# Patient Record
Sex: FEMALE | Race: WHITE | HISPANIC OR LATINO | URBAN - METROPOLITAN AREA
[De-identification: names, ages, dates, MRNs, and addresses within clinical notes are randomized per-mention and may not be internally consistent; named-entity substitution may affect disease eponyms.]

---

## 2021-11-05 ENCOUNTER — OFFICE VISIT (OUTPATIENT)
Dept: PLASTIC SURGERY | Facility: CLINIC | Age: 41
End: 2021-11-05

## 2021-11-05 VITALS — TEMPERATURE: 97.7 F

## 2021-11-05 DIAGNOSIS — L90.8 SKIN AGING: Primary | ICD-10-CM

## 2021-11-05 PROBLEM — R23.8 FACIAL AGING: Status: ACTIVE | Noted: 2021-11-05

## 2021-11-05 PROCEDURE — COS63: Performed by: NURSE PRACTITIONER

## 2021-11-05 PROCEDURE — COS06: Performed by: NURSE PRACTITIONER

## 2021-11-05 RX ORDER — TRIAMCINOLONE ACETONIDE 1 MG/G
CREAM TOPICAL
COMMUNITY
Start: 2021-08-05 | End: 2022-04-12

## 2021-11-05 RX ORDER — PREDNISONE 20 MG/1
TABLET ORAL
COMMUNITY
Start: 2021-08-05 | End: 2022-04-12

## 2021-11-05 RX ORDER — BACLOFEN 10 MG/1
TABLET ORAL
COMMUNITY
Start: 2021-09-10

## 2021-11-05 RX ORDER — CEPHALEXIN 500 MG/1
CAPSULE ORAL
COMMUNITY
Start: 2021-08-05 | End: 2022-04-12

## 2021-11-05 RX ORDER — DICLOFENAC SODIUM 75 MG/1
TABLET, DELAYED RELEASE ORAL
COMMUNITY
Start: 2021-09-10

## 2021-11-05 NOTE — PROGRESS NOTES
Subjective   Patient ID: Guadalupe is a 41 y.o. female.    History of Present Illness: History of Present Illness    Objective   Physical Exam    Assessment/Plan   {Assess/Plan SmartLinks (Optional):96258}

## 2021-11-05 NOTE — PROGRESS NOTES
Chief Complaint  Follow-up and Botulinum Toxin Injection    Subjective          History of Present Illness  Guadalupe Prakash is a 41 y.o. female who presents to [facility] as  consult for Botox Injection.  Concerns with the sides of the eyes mostly  Wants a little Botox but not a lot because this is the first time..Current skin care includes cleanser in the morning and night, Hyrolonic acid cream at night, Eye cream day and night  and Vitamin C PRN.      Allergies: Patient has no known allergies.  Allergies Reconciled.    Review of Systems     Objective     Temp 97.7 °F (36.5 °C) (Temporal)     There is no height or weight on file to calculate BMI.    Physical Exam  Head and Face  Dynamic greater than static rhytids of periorbital areas, no open areas or irritation        Result Review :     Procedures  • Photos were obtained.  •  services were used to discuss the indications, benefits, risks, alternatives, expected outcomes, and complications as to the application of Botulinum toxin to the face was discussed with the patient.  Informed consent was obtained.  She understands, accept risks, consents and wish to proceed.   • Description: Botox Injection.  In the exam room, patient's face was prepped with alcohol.  Using Botox in a 1:1 ration, periorbital areas were injected.  The patient tolerated the procedure well with no immediate complications.  Post procedure instructions were given., After injection, the areas were gently massaged.  The patient was given an icepack.  • Lot#: [id number]  • Exp#: 11/2023  • NDC: Botox: 9931-4304-40  • Total Units Used:22  • Forehead:  units  • Glabella:  units  • Right Periorbital:11  units  • Left Periorbital:11 units  • Right Perioral: units  • Left Perioral: units       Assessment and Plan      Diagnoses and all orders for this visit:    1. Skin aging (Primary)    Other orders  -     Cancel: Botulinum  Injection        Plan:  •  The patient tolerated the procedure well with no immediate complications. The patient was given an ice pack and post procedure instructions. The patient will follow-up in 2 weeks. May call office with any concerns or questions.        Follow Up     No follow-ups on file.    Patient was given instructions and counseling regarding her condition. Please see specific information pulled into the AVS if appropriate.     Isadora Bernstein, APRN  11/05/2021

## 2021-11-11 NOTE — PROGRESS NOTES
Chief Complaint  Botulinum Toxin Injection (11/05/2021 22 units )    Subjective          History of Present Illness  Guadalupe Prakash is a 41 y.o. female who presents to [facility] for Botox injection. Patient happy with results wants to do Botox in the forehead area. Declined intrepreter for today and states can understand english well enough but will let us know if she does not understand and wants .       Allergies: Patient has no known allergies.  Allergies Reconciled.    Review of Systems     Objective     Temp 97.8 °F (36.6 °C) (Temporal)     There is no height or weight on file to calculate BMI.    Physical Exam  Head and Face  Dynamic greater than static rhytids of forehead, glabella; improved rhytids of periorbital area, no open areas or irritation        Result Review :     Procedures  • Photos were obtained.  • The indications, benefits, risks, alternatives, expected outcomes, and complications as to the application of Botulinum toxin to the face was discussed with the patient.  Informed consent was obtained.  They understand, accept risks, consent and wish to proceed.   • Description: Botox Injection.  In the exam room, patient's face was prepped with alcohol.  Using Botox in a 1:1 ration, glabella, and forehead were injected.  The patient tolerated the procedure well with no immediate complications.  Post procedure instructions were given., After injection, the areas were gently massaged.   and The patient was given an icepack.  • Lot#: [id number]  • Exp#: 11/2023  • NDC: Botox: 9781-6427-01  • Total Units Used: 20  • Forehead: 9 units  • Glabella: 11 units       Assessment and Plan      Diagnoses and all orders for this visit:    1. Facial aging (Primary)        Plan:  • The patient tolerated the procedure well with no immediate complications. The patient was given an ice pack and post procedure instructions. The patient will follow-up in 2 weeks.  May call office with any concerns or questions.        Follow Up     No follow-ups on file.    Patient was given instructions and counseling regarding her condition. Please see specific information pulled into the AVS if appropriate.     Isadora Bernstein, APRN  11/19/2021

## 2021-11-19 ENCOUNTER — OFFICE VISIT (OUTPATIENT)
Dept: PLASTIC SURGERY | Facility: CLINIC | Age: 41
End: 2021-11-19

## 2021-11-19 VITALS — TEMPERATURE: 97.8 F

## 2021-11-19 DIAGNOSIS — R23.8 FACIAL AGING: Primary | ICD-10-CM

## 2021-11-19 PROCEDURE — COS06: Performed by: NURSE PRACTITIONER

## 2021-11-19 PROCEDURE — COS64: Performed by: NURSE PRACTITIONER

## 2021-12-02 NOTE — PROGRESS NOTES
Chief Complaint  Botulinum Toxin Injection (22 units on 11/5/21)    Subjective          History of Present Illness  Guadalupe Prakash is a 41 y.o. female who presents to [facility] for Botox injection follow up. Patient happy with results. Declined intrepreter for today and states can understand english well enough but will let us know if she does not understand and wants .       Allergies: Patient has no known allergies.  Allergies Reconciled.    Review of Systems     Objective     There were no vitals taken for this visit.    There is no height or weight on file to calculate BMI.    Physical Exam  Head and Face  Improved Dynamic greater than static rhytids of forehead, glabella; improved rhytids of periorbital area, no open areas or irritation        Result Review :     Procedures  • Photos were obtained.         Assessment and Plan      Diagnoses and all orders for this visit:    1. Facial aging (Primary)        Plan:  • Discussed may need repeat injection 3-6 months to maintain results. May follow up PRN. Sample of GSR was given.        Follow Up     No follow-ups on file.    Patient was given instructions and counseling regarding her condition. Please see specific information pulled into the AVS if appropriate.     Isadora Bernstein, APRN  12/03/2021

## 2021-12-03 ENCOUNTER — OFFICE VISIT (OUTPATIENT)
Dept: PLASTIC SURGERY | Facility: CLINIC | Age: 41
End: 2021-12-03

## 2021-12-03 DIAGNOSIS — R23.8 FACIAL AGING: Primary | ICD-10-CM

## 2021-12-03 PROCEDURE — COS64: Performed by: NURSE PRACTITIONER

## 2022-04-07 NOTE — PROGRESS NOTES
Chief Complaint  Botulinum Toxin Injection    Subjective          History of Present Illness  Guadalupe Prakash is a 42 y.o. female who presents to [facility] for Botox Injection. Is just wanting her crows feet.       Allergies: Patient has no known allergies.  Allergies Reconciled.    Review of Systems   All review of system has been reviewed and it  is negative except the ones note above.     Objective     There were no vitals taken for this visit.    There is no height or weight on file to calculate BMI.    Physical Exam  Head and Face  Dynamic greater than static rhytids of periorbital areas, no open areas or irritation        Result Review :     Botulinum Injection    Date/Time: 4/12/2022 1:38 PM  Performed by: Isadora Bernstein APRN  Authorized by: Isadora Bernstein APRN       Consent:      Consent obtained:  Written     Consent given by:  Patient    Procedure details:      The procedure was cosmetic.    Medication(s) used: Botox       Total units available:  22     Total units wasted:  22      • Photos were obtained.  • The indications, benefits, risks, alternatives, expected outcomes, and complications as to the application of Botulinum toxin to the face was discussed with the patient.  Informed consent was obtained.  They understand, accept risks, consent and wish to proceed.   • Description: Botox Injection.  In the exam room, patient's face was prepped with alcohol.  Using Botox in a 1:1 ration, periorbital areas were injected.  The patient tolerated the procedure well with no immediate complications.  Post procedure instructions were given., After injection, the areas were gently massaged. The patient was given an icepack.  • Lot#: [id number]  • Exp#: 10/2024  • NDC: Botox: 2533-3156-19  • Total Units Used: 22  • Right Periorbital: 11 units  • Left Periorbital: 11 units       Assessment and Plan      Diagnoses and all orders for this visit:    1. Facial aging  (Primary)    Other orders  -     Botulinum Injection        Plan:  • The patient tolerated the procedure well with no immediate complications. The patient was given an ice pack and post procedure instructions. The patient will follow-up in 2 weeks. May call office with any concerns or questions.        Follow Up     No follow-ups on file.    Patient was given instructions and counseling regarding her condition. Please see specific information pulled into the AVS if appropriate.     EMMA Moore  04/12/2022

## 2022-04-12 ENCOUNTER — PROCEDURE VISIT (OUTPATIENT)
Dept: PLASTIC SURGERY | Facility: CLINIC | Age: 42
End: 2022-04-12

## 2022-04-12 DIAGNOSIS — R23.8 FACIAL AGING: Primary | ICD-10-CM

## 2022-04-12 PROCEDURE — COS64: Performed by: NURSE PRACTITIONER

## 2022-04-12 PROCEDURE — COS06: Performed by: NURSE PRACTITIONER

## 2022-07-18 ENCOUNTER — PROCEDURE VISIT (OUTPATIENT)
Dept: PLASTIC SURGERY | Facility: CLINIC | Age: 42
End: 2022-07-18

## 2022-07-18 DIAGNOSIS — R23.8 FACIAL AGING: ICD-10-CM

## 2022-07-18 PROCEDURE — COS64: Performed by: NURSE PRACTITIONER

## 2022-07-18 PROCEDURE — COS06: Performed by: NURSE PRACTITIONER

## 2022-07-18 NOTE — PROGRESS NOTES
Chief Complaint  Botulinum Toxin Injection    Subjective          History of Present Illness  Guadalupe Prakash is a 42 y.o. female who presents to [facility] for Botox Injection. She is wanting eyes treated and to add her forehead this time too.        Allergies: Patient has no known allergies.  Allergies Reconciled.    Review of Systems   All review of system has been reviewed and it  is negative except the ones note above.     Objective     There were no vitals taken for this visit.    There is no height or weight on file to calculate BMI.    Physical Exam  Head and Face  Dynamic greater than static rhytids of forehead and periorbital areas, no open areas or irritation        Result Review :     Botulinum Injection    Date/Time: 7/18/2022 3:05 PM  Performed by: Isadora Bernstein APRN  Authorized by: Isadora Bernstein APRN       Consent:      Consent obtained:  Written     Consent given by:  Patient    Procedure details:      The procedure was cosmetic.    Medication(s) used: Botox       Total units wasted:  0      • Photos were obtained.  • The indications, benefits, risks, alternatives, expected outcomes, and complications as to the application of Botulinum toxin to the face was discussed with the patient.  Informed consent was obtained.  They understand, accept risks, consent and wish to proceed.   • Description: Botox Injection.  In the exam room, patient's face was prepped with alcohol.  Using Botox in a 1:1 ration, periorbital and forehead were injected.  The patient tolerated the procedure well with no immediate complications.  Post procedure instructions were given. After injection, the areas were gently massaged.  The patient was given an icepack.  • Lot#: [id number]  • Exp#: 03/2025  • NDC: Botox: 5283-7531-66  • Total Units Used: 18  • Forehead:  6 units    • Right Periorbital: 6 units  • Left Periorbital: 6 units       Assessment and Plan      Diagnoses and  all orders for this visit:    1. Facial aging    Other orders  -     Botulinum Injection        Plan:  • The patient wanted to decrease dose around eyes. She tolerated the procedure well with no immediate complications. The patient was given an ice pack and post procedure instructions. The patient will follow-up in 2 weeks. May call office with any concerns or questions.        Follow Up     No follow-ups on file.    Patient was given instructions and counseling regarding her condition. Please see specific information pulled into the AVS if appropriate.     Isadora Bernstein, EMMA  07/18/2022

## 2022-11-08 ENCOUNTER — PROCEDURE VISIT (OUTPATIENT)
Dept: PLASTIC SURGERY | Facility: CLINIC | Age: 42
End: 2022-11-08

## 2022-11-08 DIAGNOSIS — R23.8 FACIAL AGING: Primary | ICD-10-CM

## 2022-11-08 PROCEDURE — COS06: Performed by: NURSE PRACTITIONER

## 2022-11-08 NOTE — PROGRESS NOTES
Chief Complaint  Botulinum Toxin Injection (Botox Injections )    Subjective          History of Present Illness  Guadalupe Prakash is a 42 y.o. female who presents to [facility] as a follow up for Botox Injection.. Her last treatment was 07/18/22. She received 18 units total to periorbital and forehead. She is just wanted periorbital treated today.       Allergies: Patient has no known allergies.  Allergies Reconciled.    Review of Systems   All review of system has been reviewed and it  is negative except the ones note above.     Objective     There were no vitals taken for this visit.    There is no height or weight on file to calculate BMI.    Physical Exam  Head and Face  Dynamic greater than static rhytids of lower periorbital areas, no open areas or irritation        Result Review :     Botulinum Injection    Date/Time: 11/8/2022 2:11 PM  Performed by: Isadora Bernstein APRN  Authorized by: Isadora Bernstein APRN       Consent:      Consent obtained:  Written     Consent given by:  Patient     Risks discussed:  Dysphagia, weakness, poor cosmetic result, pain, infection, bleeding and bruising    Procedure details:      The procedure was cosmetic.    Medication(s) used: Botox       Total units available:  12     Total units wasted:  12      • Photos were obtained.  • The indications, benefits, risks, alternatives, expected outcomes, and complications as to the application of Botulinum toxin/Dysport to the face was discussed with the patient.  Informed consent was obtained.  They understand, accept risks, consent and wish to proceed.   • Description:  In the exam room, patient's face was prepped with alcohol.  Using Botox in a 1:1 ration (Dysport in a 3:1 ration), periorbital, glabella, and forehead were injected.  The patient tolerated the procedure well with no immediate complications.  Post procedure instructions were given.  After injection, the areas were gently  massaged. The patient was given an icepack.  • Lot#: [id number]  • Exp#: 03/2025  • NDC: Botox: 1930-7876-86  • Total Units Used: 12  • Right Periorbital: 6 units  • Left Periorbital: 6 units       Assessment and Plan      Diagnoses and all orders for this visit:    1. Facial aging (Primary)        Plan:  The patient tolerated the procedure well with no immediate complications. The patient may follow-up in 2 weeks if needed for touch up. May call office with any concerns or questions. Discussed treating puffiness under eyes with hydrafirm but patient will try allergy medications, massage and cold packs first.     Scribed by Alexandrea Mckeon MA, acting as a scribe for EMMA Moore, 11/08/22 14:11 EST.  EMMA Moore's signature on the note affirms that the note adequately documents the care provided.    Patient was given instructions and counseling regarding her condition. Please see specific information pulled into the AVS if appropriate.     EMMA Moore  11/08/2022

## 2023-03-31 NOTE — PROGRESS NOTES
Chief Complaint  No chief complaint on file.    Subjective          History of Present Illness  Guadalupe Prakash is a 43 y.o. female who presents to [facility]  for Botox Injection. Her last treatment was 11/8/22. She received 12 units total to periorbital.     Today patient is interested in continuing the periorbital treatment but would also like to address her forehead    Allergies: Patient has no known allergies.  Allergies Reconciled.    Review of Systems   All review of system has been reviewed and it  is negative except the ones note above.     Objective     /71 (BP Location: Left arm, Patient Position: Sitting)   Pulse 73   Temp 96.1 °F (35.6 °C) (Temporal)   Wt 61.7 kg (136 lb)   SpO2 97%     There is no height or weight on file to calculate BMI.    Physical Exam  Head and Face  Dynamic greater than static rhytids of lower periorbital areas but good carry over, rhytids of forehead no open areas or irritation        Result Review :     Procedures  • Photos were obtained.  • The indications, benefits, risks, alternatives, expected outcomes, and complications as to the application of Botulinum toxin/Dysport to the face was discussed with the patient.  Informed consent was obtained.  They understand, accept risks, consent and wish to proceed.   • Description:  In the exam room, patient's face was prepped with alcohol.  Using Botox in a 1:1 ration (Dysport in a 3:1 ration), periorbital, glabella, and forehead were injected.  The patient tolerated the procedure well with no immediate complications.  Post procedure instructions were given.  After injection, the areas were gently massaged. The patient was given an icepack.  • Lot#: [id number]  • Exp#: 11/2025  • NDC: Botox: 1542-3831-18  • Total Units Used: 13  • Right Periorbital: 4 units  • Left Periorbital: 4 units  • Forehead:  5 units       Assessment and Plan      Diagnoses and all orders for this  visit:    1. Postoperative follow-up (Primary)        Plan:  The patient tolerated the procedure well with no immediate complications. The patient may follow-up in 2 weeks if needed for touch up. May call office with any concerns or questions.  Scribed by Alexandrea Mckeon MA, acting as a scribe for EMMA Moore, 11/08/22 14:11 EST.  EMMA Moore's signature on the note affirms that the note adequately documents the care provided.    Patient was given instructions and counseling regarding her condition. Please see specific information pulled into the AVS if appropriate.     EMMA Moore  04/03/2023

## 2023-04-03 ENCOUNTER — PROCEDURE VISIT (OUTPATIENT)
Dept: PLASTIC SURGERY | Facility: CLINIC | Age: 43
End: 2023-04-03

## 2023-04-03 VITALS
WEIGHT: 136 LBS | TEMPERATURE: 96.1 F | DIASTOLIC BLOOD PRESSURE: 71 MMHG | SYSTOLIC BLOOD PRESSURE: 106 MMHG | OXYGEN SATURATION: 97 % | HEART RATE: 73 BPM

## 2023-04-03 DIAGNOSIS — Z09 POSTOPERATIVE FOLLOW-UP: Primary | ICD-10-CM

## 2023-04-03 PROCEDURE — KYSALESTAX: Performed by: NURSE PRACTITIONER

## 2023-04-03 PROCEDURE — COS64: Performed by: NURSE PRACTITIONER

## 2023-04-03 PROCEDURE — COS06: Performed by: NURSE PRACTITIONER

## 2023-09-29 NOTE — PROGRESS NOTES
Chief Complaint  Botulinum Toxin Injection (Injections today)    Subjective          History of Present Illness  Guadalupe Prakash is a 43 y.o. female who presents to [facility]  for Botox Injection. Her last treatment was 11/8/22. She received 12 units total to periorbital.     [employer] Interpretor was used for visit today at patients request.  Patient complains of headaches in her temples and back of head.  Concerned if this could be caused by botox treatments.      Allergies: Patient has no known allergies.  Allergies Reconciled.    Review of Systems   All review of system has been reviewed and it  is negative except the ones note above.     Objective     /81 (BP Location: Left arm, Patient Position: Sitting, Cuff Size: Adult)   Pulse 57   Temp 98.4 øF (36.9 øC) (Temporal)   Wt 61.7 kg (136 lb)   SpO2 95%     There is no height or weight on file to calculate BMI.    Physical Exam   Cardiovascular: Normal rate.     Pulmonary/Chest  Effort normal.     Head and Face  Dynamic greater than static rhytids of lower periorbital areas but good carry over, rhytids of forehead no open areas or irritation        Result Review :     Procedures  Photos were obtained.  The indications, benefits, risks, alternatives, expected outcomes, and complications as to the application of Botulinum toxin to the face was discussed with the patient.  Informed consent was obtained.  They understand, accept risks, consent and wish to proceed.   Description:  In the exam room, patient's face was prepped with alcohol.  Using Botox in a 1:1 ration, periorbital and forehead were injected.  The patient tolerated the procedure well with no immediate complications, minimal bruising with injections noted and arnica applied.    Post procedure instructions were given.  After injection, the areas were gently massaged. The patient was given an icepack.  Lot#: [id number]  Exp#: 4/2025  NDC: Botox:  9609-7132-07  Total Units Used: 13  Right Periorbital: 4 units  Left Periorbital: 4 units  Forehead:  5 units       Assessment and Plan      Diagnoses and all orders for this visit:    1. Facial aging (Primary)      Full face was assessed, discussed that glabella would benefit from Botox and would get longer effect with  recommended doses. She wanted to cont with last injection treatment.      Plan:  discussed that botox is used to treat migraines and could benefit from evaluation for Botox for migraines. Current medications Topamax and Diclofenac were discussed with patient, thru interpretor.  The risk of bruising was explained due to diclofenac use.  Patient verbalized understanding.  Patient will call for next appointment.  patient instructed to call office for any questions or concerns and verbalized understanding of all instructions given.     Patient was given instructions and counseling regarding her condition. Please see specific information pulled into the AVS if appropriate.     Scribed by Isidra Alfaro, acting as a scribe for EMMA Chand, 10/09/23 13:49 EDT.  EMMA Chand's signature on the note affirms that the note adequately documents the care provided.   EMMA Chand  10/09/2023

## 2023-10-09 ENCOUNTER — PROCEDURE VISIT (OUTPATIENT)
Dept: PLASTIC SURGERY | Facility: CLINIC | Age: 43
End: 2023-10-09

## 2023-10-09 VITALS
HEART RATE: 57 BPM | TEMPERATURE: 98.4 F | SYSTOLIC BLOOD PRESSURE: 143 MMHG | OXYGEN SATURATION: 95 % | DIASTOLIC BLOOD PRESSURE: 81 MMHG | WEIGHT: 136 LBS

## 2023-10-09 DIAGNOSIS — R23.8 FACIAL AGING: Primary | ICD-10-CM

## 2023-10-09 PROCEDURE — COS06: Performed by: NURSE PRACTITIONER

## 2023-10-09 PROCEDURE — KYSALESTAX: Performed by: NURSE PRACTITIONER

## 2023-10-09 RX ORDER — TOPIRAMATE 50 MG/1
TABLET, FILM COATED ORAL 2 TIMES DAILY
COMMUNITY

## 2024-03-22 NOTE — PROGRESS NOTES
"Chief Complaint  Botulinum Toxin Injection (Botox injection)    Subjective  wants to continue with same treatment        History of Present Illness  Guadalupe Prakash is a 44 y.o. female who presents to [facility] for botox injection.    Pt presents today for Botox injections. Last injection was 10/9/23 for 13 units, dosage worked well.      Allergies: Patient has no known allergies.  Allergies Reconciled.    Review of Systems   Constitutional: Negative.    HENT: Negative.     Eyes: Negative.    Respiratory: Negative.     Cardiovascular: Negative.    Gastrointestinal: Negative.    Endocrine: Negative.    Genitourinary: Negative.    Musculoskeletal: Negative.    Skin: Negative.    Allergic/Immunologic: Negative.    Neurological: Negative.    Hematological: Negative.    Psychiatric/Behavioral: Negative.        All review of system has been reviewed and it  is negative except the ones note above.     Objective     BP 98/64 (BP Location: Left arm, Patient Position: Sitting, Cuff Size: Adult)   Pulse 97   Temp 97 °F (36.1 °C) (Temporal)   Wt 63.5 kg (140 lb)   SpO2 97%     There is no height or weight on file to calculate BMI.    Physical Exam   Vitals reviewed.  Constitutional  She appears well-developed and well-nourished.     Eyes  System normal.       Cardiovascular: Normal rate.     Pulmonary/Chest  Effort normal.     Skin  Skin is warm and dry.     Psychiatric  She has a normal mood and affect.     Face  Her facial skeleton is symmetrical. She has rhytids that are present with animation, have prominent forehead lines and have prominent nasal lines.. Her skin is a III on the Thornton scale.Her facial expression is fully symmetric. She has facial weakness in the following areas: none on the right, and none on the left.         Face comments: Mid face volume  loss, she is concerned with there being \"puffiness\" under  the left eye at zygomatic arch and not on the " right. Discussed facial volume, sub q tissue and skin laxity, as well as natural asymmetry. Discussed Sculptra and fillers, what to expect and how each one can be beneficial.    Forehead and Brow  She is positive for rhytids.     Head and Face  Dynamic greater than static rhytids of forehead, glabella, and periorbital areas, no open areas or irritation        Result Review :  no new results to review this visit    Procedures  Photos were obtained.  The indications, benefits, risks, alternatives, expected outcomes, and complications as to the application of Botulinum toxin/Dysport to the face was discussed with the patient.  Informed consent was obtained.  They understand, accept risks, consent and wish to proceed.   Description:  In the exam room, patient's face was prepped with alcohol.  Using Botox in a 1:1 ration (Dysport in a 3:1 ration), periorbital, and forehead were injected.  The patient tolerated the procedure well with no immediate complications.  Post procedure instructions were given.  After injection, the areas were gently massaged.  Lot#: [id number]  Exp#: 05/2026  NDC: Botox: 9078-5162-18  Total Units Used: 13 units  Forehead: 5 units  Right Periorbital: 4 units  Left Periorbital: 4 units        Assessment and Plan      Diagnoses and all orders for this visit:    1. Facial aging (Primary)        Plan:  The patient tolerated the procedure well with no immediate complications.    RTC in 3 months for next injection.    Patient instructed to call office for any questions or concerns and verbalized understanding of all instructions given.      Scribed by Alexandrea Mckeon MA, acting as a scribe for EMMA Chand, 03/28/24 11:54 EDT.  EMMA Chand's signature on the note affirms that the note adequately documents the care provided.         Patient was given instructions and counseling regarding her condition. Please see specific information pulled into the AVS if appropriate.     Della  CAT Estrada, APRN  03/28/2024

## 2024-03-28 ENCOUNTER — PROCEDURE VISIT (OUTPATIENT)
Dept: PLASTIC SURGERY | Facility: CLINIC | Age: 44
End: 2024-03-28

## 2024-03-28 VITALS
DIASTOLIC BLOOD PRESSURE: 64 MMHG | WEIGHT: 140 LBS | HEART RATE: 97 BPM | SYSTOLIC BLOOD PRESSURE: 98 MMHG | TEMPERATURE: 97 F | OXYGEN SATURATION: 97 %

## 2024-03-28 DIAGNOSIS — R23.8 FACIAL AGING: Primary | ICD-10-CM

## 2024-05-24 ENCOUNTER — TELEPHONE (OUTPATIENT)
Dept: PLASTIC SURGERY | Facility: CLINIC | Age: 44
End: 2024-05-24

## 2024-05-24 NOTE — TELEPHONE ENCOUNTER
LVM FOR PATIENT TO CALL AND RESCHEDULE UPCOMING APPOINTMENT IN JUNE WITH DR. WHITT.    PLEASE TRANSFER TO THE OFFICE WHEN THE PATIENT RETURNS THE PHONE CALL.

## 2024-08-05 NOTE — PROGRESS NOTES
Chief Complaint  Botulinum Toxin Injection (Botox injection)    Subjective          History of Present Illness  Guadalupe Prakash is a 44 y.o. female who presents to [facility] as a Botox injection.    Pt had 13 units of Botox on 3/28/24.      Pt is interested in Vollure fillers for tear through    Allergies: Patient has no known allergies.  Allergies Reconciled.    Review of Systems   All review of system has been reviewed and it  is negative except the ones note above.     Objective     /67 (BP Location: Left arm, Patient Position: Sitting, Cuff Size: Adult)   Pulse 70   Wt 61.9 kg (136 lb 6.4 oz)   SpO2 98%     There is no height or weight on file to calculate BMI.    Physical Exam  Head and Face    Presence of dynamic Forehead wrinkles     Result Review :     Procedures  Photos were obtained.  The indications, benefits, risks, alternatives, expected outcomes, and complications as to the application of Botulinum toxin/Dysport to the face was discussed with the patient.  Informed consent was obtained.  They understand, accept risks, consent and wish to proceed.   Description:  In the exam room, patient's face was prepped with alcohol.  Using Botox in a 1:1 ration (Dysport in a 3:1 ration), periorbital, glabella, and forehead were injected.  The patient tolerated the procedure well with no immediate complications.  Post procedure instructions were given.  After injection, the areas were gently massaged. The patient was given an icepack.  Lot#: [id number]  Exp#: 07/2026  NDC: Botox: 8555-3791-92   Total Units Used:14 units  Forehead: 6 units  Glabella:  units  Right Periorbital: 4 units  Left Periorbital:4 units       Assessment and Plan      Diagnoses and all orders for this visit:    1. Facial aging [R23.8] (Primary)    This is a cosmetic visit.     The patient tolerated the procedure well with no immediate complications. The patient was given an ice pack and  post procedure instructions. The patient will follow-up in 2 weeks. May call office with any concerns or questions.    Scribed by Yuli Torres, acting as a scribe for Rosario Gillespie MD, 08/15/24 13:58 EDT.  Rosario Gillespie MD's signature on the note affirms that the note adequately documents the care provided.         Follow Up     Return RTC for next Botox injection.    Patient was given instructions and counseling regarding her condition. Please see specific information pulled into the AVS if appropriate.     Rosario Gillespie MD  08/15/2024

## 2024-08-07 ENCOUNTER — TELEPHONE (OUTPATIENT)
Dept: PLASTIC SURGERY | Facility: CLINIC | Age: 44
End: 2024-08-07

## 2024-08-07 NOTE — TELEPHONE ENCOUNTER
Spoke with patient about having an onsite  and she stated it was not necessary for her to have one to see Dr. Gillespie.

## 2024-08-15 ENCOUNTER — PROCEDURE VISIT (OUTPATIENT)
Dept: PLASTIC SURGERY | Facility: CLINIC | Age: 44
End: 2024-08-15

## 2024-08-15 VITALS
SYSTOLIC BLOOD PRESSURE: 102 MMHG | OXYGEN SATURATION: 98 % | WEIGHT: 136.4 LBS | DIASTOLIC BLOOD PRESSURE: 67 MMHG | HEART RATE: 70 BPM

## 2024-08-15 DIAGNOSIS — R23.8 FACIAL AGING: Primary | ICD-10-CM

## 2024-08-15 PROCEDURE — KYSALESTAX: Performed by: SURGERY

## 2024-08-15 PROCEDURE — COS06: Performed by: SURGERY

## 2024-09-18 ENCOUNTER — TELEPHONE (OUTPATIENT)
Dept: PLASTIC SURGERY | Facility: CLINIC | Age: 44
End: 2024-09-18

## 2024-09-23 ENCOUNTER — TELEPHONE (OUTPATIENT)
Dept: PLASTIC SURGERY | Facility: CLINIC | Age: 44
End: 2024-09-23

## 2024-11-25 NOTE — PROGRESS NOTES
Chief Complaint  Botulinum Toxin Injection (Botox)    Subjective          History of Present Illness  Guadalupe Prakash is a 44 y.o. female who presents to [facility] as a Botox injection.    Pt had 14 units of Botox on 8/15/24. Overall she reports satisfaction with her previous treatment, but c/o chandu-orbital rhytids, Left > Right, which she attributes to sleeping on this side.          Allergies: Patient has no known allergies.  Allergies Reconciled.    Review of Systems   All review of system has been reviewed and it  is negative except the ones note above.     Objective     /72 (BP Location: Left arm, Patient Position: Sitting, Cuff Size: Adult)   Pulse 84   Wt 63.5 kg (140 lb)   SpO2 97%     There is no height or weight on file to calculate BMI.    Physical Exam        Head and Face    Presence of dynamic Forehead and chandu-orbital rhytids.      Result Review :     Procedures  Photos were obtained.  The indications, benefits, risks, alternatives, expected outcomes, and complications as to the application of Botulinum toxin/Dysport to the face was discussed with the patient.  Informed consent was obtained.  They understand, accept risks, consent and wish to proceed.   Description:  In the exam room, patient's face was prepped with alcohol.  Using Botox in a 1:1 ration (Dysport in a 3:1 ration), periorbital, glabella, and forehead were injected.  The patient tolerated the procedure well with no immediate complications.  Post procedure instructions were given.  After injection, the areas were gently massaged. The patient was given an icepack.  Lot#: [id number]  Exp#: 10/2026  NDC: Botox: 8286-8358-32   Total Units Used:16 units  Forehead: 6  units  Glabella: 0 units  Right Periorbital:4 units  Left Periorbital:6 units       Assessment and Plan      Diagnoses and all orders for this visit:    1. Facial aging [R23.8] (Primary)    2. Skin aging    Other orders  -      tretinoin (RETIN-A) 0.025 % cream; Apply 1 Application topically to the appropriate area as directed Every Night. Apply thin layer to affected area qhs.  Dispense: 30 g; Refill: 5      This is a cosmetic visit.     The patient tolerated the procedure well with no immediate complications. The patient was given an ice pack and post procedure instructions. The patient will follow-up in 2 weeks. May call office with any concerns or questions.    Skin aging - Patient asked for a cream to assist with skin tightening of her chest. I advised her of the topical compound from [facility] with Tretinoin, Kojic acid and Niacinamide to apply a thin layer at bedtime as tolerated. Instructions on proper use and potential side effects discussed.     The patient tolerated the procedure well with no immediate complications. The patient was given post procedure instructions and red light treatment. The patient will follow-up in 2 weeks if needed. May call office with any concerns or questions.      Follow Up     Return for 3 months as needed for cos Botox .    Patient was given instructions and counseling regarding her condition. Please see specific information pulled into the AVS if appropriate.     Tri Singh PA-C  12/05/2024

## 2024-12-05 ENCOUNTER — PROCEDURE VISIT (OUTPATIENT)
Dept: PLASTIC SURGERY | Facility: CLINIC | Age: 44
End: 2024-12-05

## 2024-12-05 VITALS
SYSTOLIC BLOOD PRESSURE: 105 MMHG | WEIGHT: 140 LBS | HEART RATE: 84 BPM | OXYGEN SATURATION: 97 % | DIASTOLIC BLOOD PRESSURE: 72 MMHG

## 2024-12-05 DIAGNOSIS — R23.8 FACIAL AGING: Primary | ICD-10-CM

## 2024-12-05 DIAGNOSIS — L90.8 SKIN AGING: ICD-10-CM

## 2024-12-05 RX ORDER — TRETINOIN 0.25 MG/G
1 CREAM TOPICAL NIGHTLY
Qty: 30 G | Refills: 5 | Status: SHIPPED | OUTPATIENT
Start: 2024-12-05

## 2025-07-02 NOTE — PROGRESS NOTES
Chief Complaint  Botulinum Toxin Injection (Botox)    Subjective          History of Present Illness  Guadalupe Prakash is a 45 y.o. female who presents to [facility] for Botox injection.    Pt last Botox injection was 12/5/24 for 16 units; happy with results.    History of Present Illness       Allergies: Patient has no known allergies.  Allergies Reconciled.    Review of Systems   All review of system has been reviewed and it  is negative except the ones note above.     Objective     /68 (BP Location: Right arm, Patient Position: Sitting, Cuff Size: Adult)   Pulse 70   Wt 61.2 kg (135 lb)   SpO2 99%     There is no height or weight on file to calculate BMI.    Physical Exam  Head and Face  Dynamic greater than static rhytids of forehead, glabella, and periorbital areas, no open areas or irritation      Physical Exam       Result Review :     Procedures  Photos were obtained.  The indications, benefits, risks, alternatives, expected outcomes, and complications as to the application of Botulinum toxin/Dysport to the face was discussed with the patient.  Informed consent was obtained.  They understand, accept risks, consent and wish to proceed.   Description:  In the exam room, patient's face was prepped with alcohol.  Using Botox in a 1:1 ration (Dysport in a 3:1 ration), periorbital, glabella, and forehead were injected.  The patient tolerated the procedure well with no immediate complications.  Post procedure instructions were given.  After injection, the areas were gently massaged. The patient was given an icepack.  Lot#: [id number]  Exp#: 10/2026  NDC: Botox: 5734-0526-77  Total Units Used:14 units  Forehead: 6 units  Glabella:0 units  Right Periorbital:4 units  Left Periorbital: 4 units  Right Perioral: units  Left Perioral: units       Assessment and Plan    Assessment & Plan       Diagnoses and all orders for this visit:    1. Facial aging [R23.8]  (Primary)        Plan:    This is a cosmetic visit.          Follow Up     No follow-ups on file.    Patient was given instructions and counseling regarding her condition. Please see specific information pulled into the AVS if appropriate.     Rosario Gillespie MD  07/03/2025

## 2025-07-03 ENCOUNTER — PROCEDURE VISIT (OUTPATIENT)
Dept: PLASTIC SURGERY | Facility: CLINIC | Age: 45
End: 2025-07-03

## 2025-07-03 VITALS
WEIGHT: 135 LBS | DIASTOLIC BLOOD PRESSURE: 68 MMHG | HEART RATE: 70 BPM | OXYGEN SATURATION: 99 % | SYSTOLIC BLOOD PRESSURE: 100 MMHG

## 2025-07-03 DIAGNOSIS — R23.8 FACIAL AGING: Primary | ICD-10-CM
